# Patient Record
Sex: MALE
[De-identification: names, ages, dates, MRNs, and addresses within clinical notes are randomized per-mention and may not be internally consistent; named-entity substitution may affect disease eponyms.]

---

## 2023-07-30 ENCOUNTER — HOSPITAL ENCOUNTER (EMERGENCY)
Dept: HOSPITAL 97 - ER | Age: 63
Discharge: HOME | End: 2023-07-30
Payer: COMMERCIAL

## 2023-07-30 VITALS — SYSTOLIC BLOOD PRESSURE: 115 MMHG | DIASTOLIC BLOOD PRESSURE: 73 MMHG

## 2023-07-30 VITALS — OXYGEN SATURATION: 100 % | TEMPERATURE: 98 F

## 2023-07-30 DIAGNOSIS — Z88.1: ICD-10-CM

## 2023-07-30 DIAGNOSIS — M54.50: Primary | ICD-10-CM

## 2023-07-30 PROCEDURE — 99284 EMERGENCY DEPT VISIT MOD MDM: CPT

## 2023-07-30 PROCEDURE — 96372 THER/PROPH/DIAG INJ SC/IM: CPT

## 2023-07-30 NOTE — EDPHYS
Physician Documentation                                                                           

 [facility]                                                                 

Name: Trino Willis                                                                              

Age: 63 yrs                                                                                       

Sex: Male                                                                                         

: 1960                                                                                   

MRN: [id number]                                                                                   

Arrival Date: 2023                                                                          

Time: 08:35                                                                                       

Account#: [id number]                                                                            

Bed 18                                                                                            

Private MD:                                                                                       

ED Physician Shiraz Broussard                                                                         

HPI:                                                                                              

                                                                                             

08:59 This 63 yrs old Male presents to ER via Wheelchair with complaints of Back Pain.        kb  

08:59 The patient presents with pain that is acute. The symptoms are located in the L4 and    kb  

      L5. Onset: The symptoms/episode began/occurred yesterday. The pain does not radiate.        

      Associated signs and symptoms: The patient has no apparent associated signs or              

      symptoms, Pertinent negatives: constipation, fever, numbness, tingling, urinary             

      retention, vomiting, weakness. The problem was sustained when lifting from twisting.        

      Modifying factors: The patient symptoms are alleviated by rest, the patient symptoms        

      are aggravated by any movement. Severity of symptoms: At their worst the symptoms were      

      moderate, in the emergency department the symptoms are unchanged. The patient has           

      experienced a previous episode. The patient has not recently seen a physician. Pt           

      reports low back pain that started yesterday after moving bricks. States he "blew out a     

      disc" over 10 years ago and had to have surgery on L4-L5 and believes he reinjured          

      that. Full ROM of lower extremities, able to ambulate, no urinary retention or              

      constipation, no incontinence. .                                                            

                                                                                                  

Historical:                                                                                       

- Allergies:                                                                                      

08:53 Flagyl;                                                                                 ll1 

- PMHx:                                                                                           

08:53 None;                                                                                   ll1 

- PSHx:                                                                                           

08:53 back SX, collar bone SX with plates; Cholecystectomy;                                   ll1 

                                                                                                  

- Immunization history:: Adult Immunizations up to date.                                          

- Social history:: Smoking status: Patient denies any tobacco usage or history of.                

                                                                                                  

                                                                                                  

ROS:                                                                                              

08:55 Constitutional: Negative for fever, chills, and weight loss.                            kb  

08:55 Back: Positive for pain at rest, pain with movement, of the L4 and L5.                      

08:55 All other systems are negative.                                                             

                                                                                                  

Exam:                                                                                             

08:55 Constitutional:  This is a well developed, well nourished patient who is awake, alert,  kb  

      and in no acute distress. Head/Face:  Normocephalic, atraumatic. ENT:  Moist Mucous         

      membranes Cardiovascular:  Regular rate and rhythm with a normal S1 and S2.  No             

      gallops, murmurs, or rubs.  No pulse deficits. Respiratory:  Respirations even and          

      unlabored. No increased work of breathing. Talking in full sentences Abdomen/GI:  Soft,     

      non-tender. No distention Skin:  Warm, dry with normal turgor.  Normal color. MS/           

      Extremity:  Pulses equal, no cyanosis.  Neurovascular intact.  Full, normal range of        

      motion. Neuro:  Awake and alert, GCS 15, oriented to person, place, time, and               

      situation. Moves all extremities. Normal gait.                                              

08:55 Back: pain, that is moderate, of the  L4 and L5, ROM is painful, with all movement,         

      normal spinal alignment noted, CVA tenderness, is absent, vertebral tenderness, is not      

      appreciated.                                                                                

08:55 Neuro: Exam negative for acute changes, focal neuro deficits, motor deficits.               

                                                                                                  

Vital Signs:                                                                                      

08:44  / 91; Pulse 80; Resp 18; Temp 98; Pulse Ox 100% on R/A; Weight 90.72 kg; Height  ll1 

      5 ft. 11 in. ; Pain 10/10;                                                                  

09:32  / 71; Pulse 78;                                                                  hb  

09:44  / 73; Pulse 75; Resp 17; Pulse Ox 100% ; Pain 10/10;                             ll1 

08:44 Body Mass Index 27.89 (90.72 kg, 180.34 cm)                                             ll1 

08:44 Pain Scale: Adult                                                                       ll1 

09:44 Pain Scale: Adult                                                                       ll1 

                                                                                                  

MDM:                                                                                              

08:38 Patient medically screened.                                                             kb  

08:57 Differential diagnosis: chronic back pain, Fracture ruptured disc, sprain, vertebral    kb  

      fracture. Data reviewed: vital signs, nurses notes. Test considered but Not performed:      

      X-ray: lumbar x-ray considered, but pt denies any injury. Counseling: I had a detailed      

      discussion with the patient and/or guardian regarding: the historical points, exam          

      findings, and any diagnostic results supporting the discharge/admit diagnosis, the need     

      for outpatient follow up, a family practitioner, to return to the emergency department      

      if symptoms worsen or persist or if there are any questions or concerns that arise at       

      home. ED course: Discussed CT and x-ray with pt, but that a MRI would be more               

      beneficial without the added radiation exposure. Educated to follow up with PCP for         

      outpatient MRI. Educated on return precautions. Verbal understanding received. .            

                                                                                                  

Administered Medications:                                                                         

09:02 Drug: Hydrocodone-Acetaminophen PO (7.5 mg-325 mg) 1 tabs {Note: pain 10/10 RASS 0.}    ll1 

      Route: PO;                                                                                  

09:44 Follow up: Response: No adverse reaction; Pain is unchanged, physician notified; RASS:  ll1 

      Alert and Calm (0)                                                                          

09:02 Drug: Diazepam PO 5 mg {Note: rass 0.} Route: PO;                                       ll1 

09:44 Follow up: Response: No adverse reaction; Pain is unchanged, physician notified; RASS:  ll1 

      Alert and Calm (0)                                                                          

09:03 Drug: Dexamethasone IM 10 mg Route: IM; Site: right gluteus;                            hb  

09:44 Follow up: Response: No adverse reaction                                                ll1 

09:04 Drug: Ketorolac IM 30 mg Route: IM; Site: left gluteus;                                 hb  

09:44 Follow up: Response: No adverse reaction; Pain is unchanged, physician notified; RASS:  ll1 

      Alert and Calm (0)                                                                          

                                                                                                  

                                                                                                  

Disposition:                                                                                      

13:38 Co-signature as Attending Physician, Shiraz STACY was immediately available on-site ms3 

      in the Emergency Department for consultation in the care of the patient.                    

                                                                                                  

Disposition Summary:                                                                              

23 09:04                                                                                    

Discharge Ordered                                                                                 

      Location: Home                                                                          kb  

      Condition: Stable                                                                       kb  

      Diagnosis                                                                                   

        - Low back pain                                                                       kb  

      Followup:                                                                               kb  

        - With: Emergency Department                                                               

        - When: As needed                                                                          

        - Reason: Worsening of condition                                                           

      Followup:                                                                               kb  

        - With: Private Physician                                                                  

        - When: 2 - 3 days                                                                         

        - Reason: Recheck today's complaints, Continuance of care, Re-evaluation by your           

      physician                                                                                   

      Discharge Instructions:                                                                     

        - Discharge Summary Sheet                                                             kb  

        - Acute Back Pain, Adult                                                              kb  

        - Musculoskeletal Pain                                                                kb  

        - Herniated Disk, Easy-to-Read                                                        kb  

      Forms:                                                                                      

        - Medication Reconciliation Form                                                      kb  

        - Thank You Letter                                                                    kb  

        - Antibiotic Education                                                                kb  

        - Prescription Opioid Use                                                             kb  

        - Patient Portal Instructions                                                         kb  

        - Work release form                                                                   ll1 

      Prescriptions:                                                                              

        - Cyclobenzaprine 10 mg Oral Tablet                                                        

            - take 1 tablet by ORAL route every 8 hours As needed; 21 tablet; Refills: 0,     kb  

      Product Selection Permitted                                                                 

        - Medrol (Palomo) 4 mg Oral Tablets, Dose Pack                                                

            - take 1 tablet by ORAL route as directed - follow package instructions; 1        kb  

      packet; Refills: 0, Product Selection Permitted                                             

Signatures:                                                                                       

Susan Figueroa FNP-C FNP-Ckb Baxter, Heather, RN                     RN                                                      

Ned Licona RN                       RN   ll1                                                  

Broussard, Shiraz, DO                        DO   ms3                                                  

                                                                                                  

**************************************************************************************************

## 2023-07-30 NOTE — ER
Nurse's Notes                                                                                     

 [facility]                                                                 

Name: Trino Willis                                                                              

Age: 63 yrs                                                                                       

Sex: Male                                                                                         

: 1960                                                                                   

MRN: [id number]                                                                                   

Arrival Date: 2023                                                                          

Time: 08:35                                                                                       

Account#: [id number]                                                                            

Bed 18                                                                                            

Private MD:                                                                                       

Diagnosis: Low back pain                                                                          

                                                                                                  

Presentation:                                                                                     

                                                                                             

08:44 Chief complaint: Patient states: Back pain after moving bricks yesterday. Coronavirus   ll1 

      screen: Vaccine status: Patient reports receiving the 2nd dose of the covid vaccine.        

      Client denies travel out of the [place] in the last 14 days. At this time, the client does     

      not indicate any symptoms associated with coronavirus-19. Ebola Screen: Patient denies      

      travel to an Ebola-affected area in the 21 days before illness onset. Initial Sepsis        

      Screen: Does the patient meet any 2 criteria? No. Patient's initial sepsis screen is        

      negative. Does the patient have a suspected source of infection? Yes: Bone or joint         

      infection. Risk Assessment: Do you want to hurt yourself or someone else? Patient           

      reports no desire to harm self or others. Onset of symptoms was 2023.              

08:44 Method Of Arrival: Wheelchair                                                           ll1 

08:44 Acuity: BEBA 4                                                                           ll1 

                                                                                                  

Triage Assessment:                                                                                

08:45 General: Appears uncomfortable, Behavior is calm, cooperative, appropriate for age.     ll1 

      Pain: Complains of pain in back Quality of pain is described as aching, throbbing.          

      Musculoskeletal: Circulation, motion, and sensation intact. Capillary refill < 3            

      seconds, severe back pain.                                                                  

                                                                                                  

Historical:                                                                                       

- Allergies:                                                                                      

08:53 Flagyl;                                                                                 ll1 

- PMHx:                                                                                           

08:53 None;                                                                                   ll1 

- PSHx:                                                                                           

08:53 back SX, collar bone SX with plates; Cholecystectomy;                                   ll1 

                                                                                                  

- Immunization history:: Adult Immunizations up to date.                                          

- Social history:: Smoking status: Patient denies any tobacco usage or history of.                

                                                                                                  

                                                                                                  

Screenin:45 [facility] ED Fall Risk Assessment (Adult) Impaired Gait Yes (1 pt) Mobility Assist       ll1 

      Device Used Yes (1 pt) Score/Fall Risk Level 3 or more points = High Risk Oriented to       

      surroundings, Maintained a safe environment, Educated pt \T\ family on fall prevention,     

      incl call for assistance when getting out of bed, Provided non-skid footwear, Hourly        

      rounding (assess needs \T\ fall precautionary measures) done, Remained with patient while   

      ambulating. Abuse screen: Denies threats or abuse. Nutritional screening: No deficits       

      noted. Tuberculosis screening: No symptoms or risk factors identified.                      

                                                                                                  

Assessment:                                                                                       

09:33 Reassessment: No changes from previously documented assessment. Patient and/or family   hb  

      updated on plan of care and expected duration. Pain level reassessed. Patient is alert,     

      oriented x 3, equal unlabored respirations, skin warm/dry/pink.                             

09:44 Reassessment: No changes from previously documented assessment. Patient and/or family   ll1 

      updated on plan of care and expected duration. Pain level reassessed. Patient is alert,     

      oriented x 3, equal unlabored respirations, skin warm/dry/pink.                             

                                                                                                  

Vital Signs:                                                                                      

08:44  / 91; Pulse 80; Resp 18; Temp 98; Pulse Ox 100% on R/A; Weight 90.72 kg; Height  ll1 

      5 ft. 11 in. ; Pain 10/10;                                                                  

09:32  / 71; Pulse 78;                                                                  hb  

09:44  / 73; Pulse 75; Resp 17; Pulse Ox 100% ; Pain 10/10;                             ll1 

08:44 Body Mass Index 27.89 (90.72 kg, 180.34 cm)                                             ll1 

08:44 Pain Scale: Adult                                                                       ll1 

09:44 Pain Scale: Adult                                                                       ll1 

                                                                                                  

ED Course:                                                                                        

08:38 Patient arrived in ED.                                                                  ts1 

08:38 [facility]                                                        kb  

08:38 Shiraz Broussard DO is Attending Physician.                                                kb  

08:40 Arm band placed on Patient placed in an exam room, on a stretcher.                      ll1 

08:45 Triage completed.                                                                       ll1 

08:46 Patient has correct armband on for positive identification. Bed in low position. Call   ll1 

      light in reach. Client placed on continuous cardiac and pulse oximetry monitoring. NIBP     

      monitoring applied.                                                                         

08:53 Ned Licona, RN is Primary Nurse.                                                     ll1 

09:44 No provider procedures requiring assistance completed. Patient did not have IV access   ll1 

      during this emergency room visit.                                                           

09:45 Provided Education on: n/a.                                                             ll1 

                                                                                                  

Administered Medications:                                                                         

09:02 Drug: Hydrocodone-Acetaminophen PO (7.5 mg-325 mg) 1 tabs {Note: pain 10/10 RASS 0.}    ll1 

      Route: PO;                                                                                  

09:44 Follow up: Response: No adverse reaction; Pain is unchanged, physician notified; RASS:  ll1 

      Alert and Calm (0)                                                                          

09:02 Drug: Diazepam PO 5 mg {Note: rass 0.} Route: PO;                                       ll1 

09:44 Follow up: Response: No adverse reaction; Pain is unchanged, physician notified; RASS:  ll1 

      Alert and Calm (0)                                                                          

09:03 Drug: Dexamethasone IM 10 mg Route: IM; Site: right gluteus;                            hb  

09:44 Follow up: Response: No adverse reaction                                                ll1 

09:04 Drug: Ketorolac IM 30 mg Route: IM; Site: left gluteus;                                 hb  

09:44 Follow up: Response: No adverse reaction; Pain is unchanged, physician notified; RASS:  ll1 

      Alert and Calm (0)                                                                          

                                                                                                  

                                                                                                  

Medication:                                                                                       

08:46 VIS not applicable for this client.                                                     ll1 

                                                                                                  

Outcome:                                                                                          

09:04 Discharge ordered by MD.                                                                kb  

09:45 Discharged to home via wheelchair.                                                      ll1 

09:45 Condition: stable                                                                           

09:45 Discharge instructions given to patient, Instructed on discharge instructions, follow       

      up and referral plans. no driving heavy equipment, medication usage, Demonstrated           

      understanding of instructions, follow-up care, medications, Prescriptions given X 2.        

09:46 Patient left the ED.                                                                    1 

                                                                                                  

Signatures:                                                                                       

Susan Figueroa, KALPESH-C                 FNP-Ckb                                                   

Aranza Su RN RN                                                      

Ned Licona RN RN   ll1                                                  

Svetlana Berry PAS                     PAS  ts1                                                  

                                                                                                  

Corrections: (The following items were deleted from the chart)                                    

08:46 08:44  / 91; Pulse 80bpm; Resp 18bpm; Pulse Ox 100% RA; ll1                       ll1 

08:54 08:44  / 91; Pulse 80bpm; Resp 18bpm; Pulse Ox 100% RA; Pain 10/10, Adult; ll1    ll1 

09:32 09:32  / 71; hb                                                                   hb  

09:34 09:09 Dexamethasone IM 10 mg IM in right gluteus ll1                                    hb  

09:34 09:09 Ketorolac IM 30 mg IM in left gluteus ll1                                         hb  

                                                                                                  

**************************************************************************************************

## 2023-07-30 NOTE — XMS REPORT
Continuity of Care Document

                            Created on:2023



Patient:VANESA WILLIS

Sex:Male

:1960

External Reference #:[id number]





Demographics







                          Address                    



                                                    [place] [address]

 

                          Home Phone                [phone]

 

                          Work Phone                [phone]

 

                          Mobile Phone              [phone]

 

                          Email Address             [email or web address]

 

                          Preferred Language        English

 

                          Marital Status            Unknown

 

                          Hinduism Affiliation     Unknown

 

                          Race                      Unknown

 

                          Additional Race(s)        Unavailable



                                                    White

 

                          Ethnic Group              Unknown









Author







                          Organization              [facility]

t

 

                          Address                   [address] [place] [address] [address] [place] [id number]

 

                          Phone                     [phone]









Support







                Name            Relationship    Address         Phone

 

                Malick Willis  Spouse          Unavailable     [phone]









Care Team Providers







                    Name                Role                Phone

 

                    Lex Wang MD    Primary Care Physician [phone]

 

                    MÓNICA          Attending Clinician Unavailable

 

                    ATA LANGE     Attending Clinician Unavailable

 

                    LEX WANG       Attending Clinician Unavailable

 

                    Kal Head Attending Clinician [phone]

 

                    WILEY FISH M.D. Attending Clinician Unavailable

 

                    MÓNICA          Admitting Clinician Unavailable









Payers







           Payer Name Policy Type Policy Number Effective Date Expiration Date S

elan

 

           BC-FL: BLUE            [id number] 2019            



           OPTIONS (PPO)                       00:00:00              







Problems







       Condition Condition Condition Status Onset  Resolution Last   Treating Co

mments 

Source



       Name   Details Category        Date   Date   Treatment Clinician        



                                                 Date                 

 

       Chronic Chronic Problem Active 2021                             [place]



       neck pain Neck Pain               1-08                               Comm

uni



       for    for                  00:00:                             ty



       greater Greater               00                                 Hospita



       than 3 than 3                                                  l



       months Months                                                  Clinics

 

       Hyperchole Hyperchole Problem Active 2021                             [facility]

[facility]



       sterolemia sterolemia               0-07                               Co

mmuni



                                   00:00:                             ty



                                   00                                 Hospita



                                                                      l



                                                                      Clinics

 

       Osteoarthr Osteoarthr Problem Active                              S

[facility]



       itis of itis of               8-19                               Communi



       knee   Knee                 00:00:                             ty



                                   00                                 Hospita



                                                                      l



                                                                      Clinics

 

       Pain in Pain in Problem Active                              [place]



       left knee Left Knee               6-26                               Comm

uni



                                   00:00:                             ty



                                   00                                 Hospita



                                                                      l



                                                                      Clinics

 

       Benign Benign Problem Active                              [place]



       prostatic Prostatic               5-22                               Comm

uni



       hyperplasi Hyperplasi               00:00:                             ty



       a      a                    00                                 Hospita



                                                                      l



                                                                      Clinics

 

       Secondary Secondary Problem Active                              [facility]

[facility]



       erectile Erectile               5-22                               Commun

i



       dysfunctio Dysfunctio               00:00:                             ty



       n      n                    00                                 Hospita



                                                                      l



                                                                      Clinics

 

       Acute pain Acute pain Problem Active                                    U

T



       of right of right                                                  Physic

i



       shoulder shoulder                                                  ans

 

       Strain of Strain of Problem Active                                    [facility]



       muscle, muscle,                                                  Physici



       fascia and fascia and                                                  an

s



       tendon of tendon of                                                  



       long head long head                                                  



       of biceps, of biceps,                                                  



       right arm, right arm,                                                  



       subsequent subsequent                                                  



       encounter encounter                                                  

 

       Primary Primary Problem Active                                    [facility]



       osteoarthr osteoarthr                                                  Ph

ysici



       itis of itis of                                                  ans



       right  right                                                   



       shoulder shoulder                                                  







Allergies, Adverse Reactions, Alerts







       Allergy Allergy Status Severity Reaction(s) Onset  Inactive Treating Comm

ents 

Source



       Name   Type                        Date   Date   Clinician        

 

       Metronid Propensi Active        Itching                       [facility]

di



       azole  ty to                                               st



              adverse                      00:00:                      Hospita



              reaction                      00                          l



              s to                                                    



              drug                                                    

 

       Flagyl Allergy Active                                           [place]



              to                                                      Communi



              substanc                                                  ty



              e                                                       Hospita



                                                                      l



                                                                      Clinics







Family History







           Family Member Diagnosis  Comments   Start Date Stop Date  Source

 

           Natural father                                             [facility]

 

           Natural mother                                             [facility]







Social History







           Social Habit Start Date Stop Date  Quantity   Comments   Source

 

           Gender identity                                             [facility]

 

           Sexual orientation                                             [facility]

[facility]



                                                                  [facility]ospital

 

           Tobacco use and 2022 Smokeless             [facility]



           exposure   00:00:00   00:00:00   tobacco non-user            [facility]

 

           Alcohol intake 2022 Lifetime              [facility]



                      00:00:00   00:00:00   non-drinker            Hospital



                                            (finding)             

 

           History of Social 2022                       [facility]

st



           function   00:00:00   00:00:00                         [facility]

 

           Sex Assigned At 1960                       [facility]



           Birth      00:00:00   00:00:00                         Hospital









                Smoking Status  Start Date      Stop Date       Source

 

                Never smoked tobacco                                 [facility]

ospital







Medications







       Ordered Filled Start  Stop   Current Ordering Indication Dosage Frequency

 Signature

                    Comments            Components          Source



     Medication Medication Date Date Medication? Clinician                (SIG) 

          



     Name Name                                                   

 

     Multi Multi           No                       Multi           [place]



     Vitamin 1 Vitamin 1                                    Vitamin 1           

Communi



     po qd po qd                                    po qd           ty



                                                                 [facility]

 

     sildenafil sildenafil           No             1    Q1D  sildenafil        

   [place]



     50 mg 50 mg                                    50 mg           Communi



     tablet Take tablet Take                                    tablet          

 ty



     1 tablet 1 tablet                                    Take 1           Hospi

ta



     every day every day                                    tablet           l



     by oral by oral                                    every day           Clin

ics



     route as route as                                    by oral           



     needed. needed.                                    route as           



                                                  needed.           

 

     Multi Multi           No                       Multi           [place]



     Vitamin 1 Vitamin 1                                    Vitamin 1           

Communi



     po qd po qd                                    po qd           ty



                                                                 Hospita



                                                                 l



                                                                 Clinics

 

     sildenafil sildenafil           No             1    Q1D  sildenafil        

   [place]



     50 mg 50 mg                                    50 mg           Communi



     tablet Take tablet Take                                    tablet          

 ty



     1 tablet 1 tablet                                    Take 1           Hospi

ta



     every day every day                                    tablet           l



     by oral by oral                                    every day           Clin

ics



     route as route as                                    by oral           



     needed. needed.                                    route as           



                                                  needed.           







Immunizations







           Ordered Immunization Filled Immunization Date       Status     Commen

ts   Source



           Name       Name                                        

 

           SARS-COV-2 SARS-COV-2 2021 Completed             [place] [facility]

ty



           (COVID-19) vaccine, (COVID-19) vaccine, 00:00:00                     

    Hospital Clinics



           UNSPECIFIED UNSPECIFIED                                  

 

           SARS-COV-2 SARS-COV-2 2021 Completed             [place] [facility]

ty



           (COVID-19) vaccine, (COVID-19) vaccine, 00:00:00                     

    Hospital Clinics



           UNSPECIFIED UNSPECIFIED                                  

 

           SARS-COV-2 SARS-COV-2 2021 Completed             [place] [facility]

ty



           (COVID-19) vaccine, (COVID-19) vaccine, 00:00:00                     

    Hospital Clinics



           UNSPECIFIED UNSPECIFIED                                  

 

           SARS-COV-2 SARS-COV-2 2021 Completed             [place] [facility]

ty



           (COVID-19) vaccine, (COVID-19) vaccine, 00:00:00                     

    Hospital Clinics



           UNSPECIFIED UNSPECIFIED                                  







Vital Signs







             Vital Name   Observation Time Observation Value Comments     Source

 

             BP Diastolic 2021   80 mm[Hg]                 [place] [facility]

y



                          00:00:00                               Hospital Clinic

s

 

             Height       2021   72 [in_i]                 [place] [facility]

y



                          00:00:00                               [facility]ospital Clinic

s

 

             BMI (Body Mass 2021   26.7 kg/m2                [place] [facility]

ity



             Index)       00:00:00                               Hospital Clinic

s

 

             BP Systolic  2021   122 mm[Hg]                [place] [facility]

y



                          00:00:00                               [facility]ospital Clinic

s

 

             Body Weight  2021   3152 [oz_av]              [place] [facility]

y



                          00:00:00                               [facility]ospital Clinic

s

 

             BP Diastolic 2021-10-07   72 mm[Hg]                 [place] [facility]

y



                          00:00:00                               [facility]ospital Clinic

s

 

             Height       2021-10-07   72 [in_i]                 [place] [facility]

y



                          00:00:00                               Hospital Clinic

s

 

             BMI (Body Mass 2021-10-07   25.9 kg/m2                [facility]



             Index)       00:00:00                               Hospital Clinic

s

 

             BP Systolic  2021-10-07   110 mm[Hg]                [facility]

y



                          00:00:00                               Hospital Clinic

s

 

             Body Weight  2021-10-07   3056 [oz_av]              [facility]

y



                          00:00:00                               Hospital Clinic

s

 

             BP Systolic  2018   123 mm[Hg]   Location: [facility]



                          15:38:00                  Position:    



                                                    Sitting      

 

             BP Diastolic 2018   82 mm[Hg]    Location: [facility]



                          15:38:00                  Position:    



                                                    Sitting      

 

             Height       2018   72 [in_us]                [facility]



                          15:38:00                               

 

             Weight       2018   185 [lb_av]               [facility]



                          15:38:00                               

 

             Body Mass Index 2018   25.09 kg/m2               [facility]

s



             Calculated   15:38:00                               

 

             Heart Rate   2018   69 /min                   [facility]



                          15:38:00                               







Procedures







                Procedure       Date / Time     Performing Clinician Source



                                Performed                       

 

                XR, cervical spine, 2 or 3 2021 00:00:00                 S

[facility]

 

                MR Shoulder w contrast 2018 00:00:00                 [facility]

[facility]



                [id number]                                           

 

                DX Inj Arthrogram Shoulder 2018 00:00:00                 [facility]

[facility]



                Unilat DX                                       

 

                Excision of Lumbar 2014 00:00:00                 [facility]



                Intervertebral Disc                                 [facility] Cli

nics

 

                History of Shoulder                                 [facility]

s



                Surgery                                         

 

                Cholecystectomy                                 [facility]

 

                Dental Surgery Procedure                                 [facility]

 

                Procedure on Shoulder                                 [facility]







Plan of Care







             Planned Activity Planned Date Details      Comments     Source

 

             Future Scheduled Test 2023   [facility]

[facility]



                          10:43:56     malignant neoplasm              



                                       of colon                  



                                       (procedure) [code =              



                                       417625617]                

 

             Future Scheduled Test 2023   SHINGLES VACCINES              [facility]



                          10:43:56     (1 of 2) [code =              



                                       SHINGLES VACCINES              



                                       (1 of 2)]                 

 

             Future Scheduled Test 2023   COVID-19 VACCINE (3             

 [facility]



                          10:43:56     - Mixed Product              



                                       series) [code =              



                                       COVID-19 VACCINE (3              



                                       - Mixed Product              



                                       series)]                  

 

             Future Scheduled Test 2023   INFLUENZA VACCINE              M

[facility]



                          10:43:56     [code = INFLUENZA              



                                       VACCINE]                  

 

             Future Scheduled Test 2023   [facility]              [facility]



                          10:43:56     malignant neoplasm              



                                       of colon                  



                                       (procedure) [code =              



                                       085105016]                

 

             Future Scheduled Test 2023   Screening for              [facility]



                          10:43:56     malignant neoplasm              



                                       of colon                  



                                       (procedure) [code =              



                                       265028423]                

 

             Future Scheduled Test 2023   Screening for              [facility]



                          10:43:56     malignant neoplasm              



                                       of colon                  



                                       (procedure) [code =              



                                       902927711]                

 

             Future Scheduled Test 2023   Hepatitis C               [facility]

[facility]



                          10:43:56     screening                 



                                       (procedure) [code =              



                                       020781069]                

 

             Future Scheduled Test 2023   Screening for              [facility]

[facility]



                          10:43:56     malignant neoplasm              



                                       of colon                  



                                       (procedure) [code =              



                                       148388954]                

 

             Diagnostic Test 2021-10-07   PSA, serum or              [place] [facility]

unity



             Pending      00:00:00     plasma [code = PSA,              Hospital

 Clinics



                                       serum or plasma]              

 

             Diagnostic Test 2021-10-07   lipid panel, serum              [place]

 Community



             Pending      00:00:00     [code = lipid              [facility] Clini

cs



                                       panel, serum]              

 

             Diagnostic Test 2021-10-07   CBC w/ auto diff              [place] C

ommunity



             Pending      00:00:00     [code = CBC w/ auto              Hospital

 Clinics



                                       diff]                     

 

             Diagnostic Test 2021-10-07   CMP, serum or              [place] Comm

unity



             Pending      00:00:00     plasma [code = CMP,              Hospital

 Clinics



                                       serum or plasma]              







Encounters







        Start   End     Encounter Admission Attending Care    Care    Encounter 

Source



        Date/Time Date/Time Type    Type    Clinicians Facility Department ID   

   

 

        2022 Outpatient         [facility]    [id number]

[id number] [place]



        01:56:00 01:56:00                                         214     Commun

i



                                                                        ty



                                                                        Hospita



                                                                        l



                                                                        [facility]

 

        2022 Outpatient         [facility]     [id number]

[address] [place]



        00:00:00 00:00:00                 [facility]                 [address]     [facility]

di



                                                                        st

 

        2022 Outpatient         [facility]     [id number]

[address] [place]



        00:00:00 00:00:00                 [facility]                 009     [facility]

di



                                                                        st

 

        2022-01-10 2022-01-10 Outpatient         [facility]    [address]

- [place]



        01:26:00 01:26:00                                         110     Commun

i



                                                                        ty



                                                                        Hospita



                                                                        l



                                                                        Clinics

 

        2021 Outpatient         [facility]    [id number]

[id number] [place]



        12:58:00 12:58:00                                         206     Commun

i



                                                                        ty



                                                                        Hospita



                                                                        l



                                                                        Clinics

 

        2021 Outpatient         ERICKSON_R [facility]    [id number]

-[id number] [place]



        02:32:00 02:32:00                                         203     Commun

i



                                                                        ty



                                                                        Hospita



                                                                        l



                                                                        Clinics

 

        2021 Outpatient         GLORIA  [facility]     [id number] [place] [address]



        00:00:00 00:00:00                 LEX                  133     Method

i



                                                                        st

 

        2021 Outpatient         GLORIA  [facility]     [id number]

[address] [place] [address]



        00:00:00 00:00:00                 LEX                  132     Method

i



                                                                        st

 

        2021 Outpatient         ERICKSON_R [facility]    9314

- [place]



        12:12:00 12:12:00                                         108     Commun

i



                                                                        ty



                                                                        Hospita



                                                                        l



                                                                        Clinics

 

        2021 Outpatient         Kassidy, [facility]    [id number]

[id number] 



        00:00:00 00:00:00                 Kal                 [id number] 



                                        [place]                 [id number] 



                                                                [id number]  

 

        2021 Kal                 [facility]    [place] 2021

08 [place]



        00:00:00 00:00:00 [facility]



                        DO: [address]                         [facility]          Hospit

a



                        [facility]         l



                        [address],                         HOSPITAL         Clinic

s



                        [place], [place]                         CLINIC,          



                        [id number]                         [place]         



                        , Ph.                                           



                        [phone]                                             

 

        2021-10-07 2021-10-07 Outpatient         ERICKSON_R [facility]    [id number]

-[id number] [place]



        09:42:00 09:42:00                                         007     Commun

i



                                                                        ty



                                                                        Hospita



                                                                        l



                                                                        Clinics

 

        2021-10-07 2021-10-07 Kal                 [facility]    [place] 

07 [place]



        00:00:00 00:00:00 [facility]



                        DO: [address]                         [place]          Hospit

a



                        [facility]



                        [address],                         [facility]

s



                        [facility],          



                        [address]                         [place]         



                        , Ph.                                           



                        [phone]                                         



                        [phone]                                             

 

        2021-10-07 2021-10-07 [facility]    [id number]

[id number] 



        00:00:00 00:00:00                 Kal                 [id number] 



                                        [place]                 [id number] 



                                                                [id number]  

 

        2019 BOBBY Ohara     [facility]     [id number]

7 [place]



        15:15:00 15:15:00 t; [facility] MATTHEW, M.D.            Surgery -         ans



                        ALVA [address]         



                                                        [address]         

 

        2018 BOBBY Ohara     [facility]     [id number]

1 [place]



        14:45:00 14:45:00 t; WILEY FISH         Orthopedic         DAVID JAMA M.D.            Surgery -         ans



                        [address]         



                                                        [address]         







Results

This patient has no known results.